# Patient Record
Sex: FEMALE | Race: WHITE | Employment: OTHER | URBAN - METROPOLITAN AREA
[De-identification: names, ages, dates, MRNs, and addresses within clinical notes are randomized per-mention and may not be internally consistent; named-entity substitution may affect disease eponyms.]

---

## 2022-12-13 ENCOUNTER — EVALUATION (OUTPATIENT)
Dept: PHYSICAL THERAPY | Facility: CLINIC | Age: 77
End: 2022-12-13

## 2022-12-13 DIAGNOSIS — M25.561 CHRONIC PAIN OF RIGHT KNEE: Primary | ICD-10-CM

## 2022-12-13 DIAGNOSIS — G89.29 CHRONIC PAIN OF RIGHT KNEE: Primary | ICD-10-CM

## 2022-12-13 DIAGNOSIS — M17.11 OSTEOARTHRITIS OF RIGHT KNEE, UNSPECIFIED OSTEOARTHRITIS TYPE: ICD-10-CM

## 2022-12-13 NOTE — PROGRESS NOTES
PT Evaluation     Today's date: 2022  Patient name: Atif Nunes  : 1945  MRN: [id number]  Referring provider: Alexys Chanel MD  Dx:   Encounter Diagnosis     ICD-10-CM    1  Chronic pain of right knee  M25 561     G89 29       2  Osteoarthritis of right knee, unspecified osteoarthritis type  M17 11           Start Time: 1230  Stop Time: 1315  Total time in clinic (min): 45 minutes    Assessment  Assessment details: Pt is a 69 y o female who presents to skilled physical therapy with complaints of chronic R knee pain and hx of falls over the last 2 years  Pt demonstrated decreased R knee A/PROM in both flexion and extension with pain, decreased knee MMT, TTP globally around her R knee, gait dysfunction, and altered functional mechanics due to pain and weakness  Pt's hx and clinical findings are consistent with the referring diagnosis  Pt's pain and deficits are preventing her from performing self care, ADLs, and recreational activities without compensations  Pt was educated on her diagnosis, prognosis, POC, and HEP  Pt would benefit from skilled physical therapy to reduce her pain, address her deficits, progress towards her goals, and return to her PLOF  Impairments: abnormal coordination, abnormal gait, abnormal muscle firing, abnormal muscle tone, abnormal or restricted ROM, abnormal movement, activity intolerance, impaired balance, impaired physical strength, lacks appropriate home exercise program, pain with function, safety issue, poor posture  and poor body mechanics    Symptom irritability: moderateUnderstanding of Dx/Px/POC: good   Prognosis: good    Goals  Short Term Goals:  1) Pt will initiate and progress her HEP in 3-4 weeks  2) Pt will improve her knee ROM by 25% with less than 3/10 pain in 3-4 weeks  3) Pt will improve knee MMT by 1 to improve ADLs in 3-4 weeks  Long Term Goals:  1) Pt will be able to ambulate for 20-30 mins with less than 2/10 pain in 6-8 weeks    2) Pt will be able to ambulate up and down 15 steps reciprocally with less than 2/10 pain in 6-8 weeks  3) Pt will be able to get up and down from the floor with less than 2/10 pain in 6-8 weeks  4) Pt will be able to perform ADLs with less than 2/10 pain in 6-8 weeks  Plan  Patient would benefit from: skilled physical therapy and PT eval  Planned modality interventions: cryotherapy  Planned therapy interventions: activity modification, ADL retraining, balance, body mechanics training, coordination, joint mobilization, manual therapy, motor coordination training, neuromuscular re-education, patient education, postural training, self care, strengthening, stretching, therapeutic activities, therapeutic exercise, flexibility, functional ROM exercises, gait training, graded exercise and home exercise program  Frequency: 1-2x/week  Duration in weeks: 8  Treatment plan discussed with: patient        Subjective Evaluation    History of Present Illness  Mechanism of injury: Pt states that her R knee is in pain, might need to have a knee replacement in the future  Pt fell on it twice in the past 2 years, states having OA in her knee as well as bone spurs  Pt had an MRI revealing a pulled ligament as well  Pt received a cortisone injection that helped for 5 weeks  Then had a gel shot which made her knee worse, felt it go down her leg  Pt felt her knee then start giving out  Followed up again with Dr Trinh Herndon and got another steroid injection which helped again  Has been feeling better for the last month  Pt feels she is not physically in good shape  Pt has a hx of polio  No previous injuries or surgeries to her knee  Pt is retired and watches her 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  Pt denies numbness or tingling, no problems sleeping     Pain  Current pain ratin  At best pain ratin  At worst pain ratin  Location: deep in the knee  Quality: sharp (stabbing)  Relieving factors: rest, ice and medications  Aggravating factors: walking, stair climbing, standing, lifting and running  Progression: worsening    Social Support  Steps to enter house: yes  Stairs in house: yes   Lives in: multiple-level home  Lives with: adult children    Employment status: not working    Diagnostic Tests  X-ray: abnormal  Patient Goals  Patient goals for therapy: decreased pain, improved balance, increased motion, increased strength and independence with ADLs/IADLs  Patient goal: get stronger, walk better        Objective     Tenderness     Right Knee   Tenderness in the LCL (distal), MCL (distal), patellar tendon, pes anserinus and quadriceps tendon  Active Range of Motion   Left Knee   Flexion: 135 degrees   Extension: 0 degrees     Right Knee   Flexion: 122 degrees with pain  Extension: -8 degrees with pain    Passive Range of Motion   Left Knee   Flexion: 140 degrees with pain  Extension: 1 degrees     Right Knee   Flexion: 124 degrees with pain  Extension: -5 degrees with pain    Strength/Myotome Testing     Left Knee   Flexion: 4  Extension: 4  Quadriceps contraction: good    Right Knee   Flexion: 3+  Extension: 3  Quadriceps contraction: fair    Ambulation   Weight-Bearing Status   Weight-Bearing Status (Left): full weight bearing   Weight-Bearing Status (Right): full weight-bearing    Assistive device used: none    Observational Gait   Gait: asymmetric   Walking speed and stride length within functional limits  Functional Assessment      Squat    Left tibial anterior translation beyond toes and right tibial anterior translation beyond toes  Forward Step Up 8"     Right Leg  Pain and valgus  Forward Step Down 8"     Right Leg  Pain, valgus and contralateral toe touch first      Single Leg Stance   Right single leg stance time: unable                  Precautions: hx of colon cancer, historectomy, COPD, asthma  HEP: Access Code [email or web address]    Date     12/13   Visit Number     1 (IE)   Manuals                                        Neuro Re-Ed Ther Ex                                                                        Ther Activity                        Gait Training                        Modalities

## 2022-12-13 NOTE — LETTER
2022    Colby Oro MD  [address] [place] [address] [place] [id number]    Patient: Jessica Aguillon   YOB: 1945   Date of Visit: 2022     Encounter Diagnosis     ICD-10-CM    1  Chronic pain of right knee  M25 561     G89 29       2  Osteoarthritis of right knee, unspecified osteoarthritis type  M17 11           Dear Dr Annmarie Miramontes: Thank you for your recent referral of Jessica Aguillon  Please review the attached evaluation summary from Carla's recent visit  Please verify that you agree with the plan of care by signing the attached order  If you have any questions or concerns, please do not hesitate to call  I sincerely appreciate the opportunity to share in the care of one of your patients and hope to have another opportunity to work with you in the near future  Sincerely,    Hilaria Salinas, PT      Referring Provider:      I certify that I have read the below Plan of Care and certify the need for these services furnished under this plan of treatment while under my care  Colby Oro MD  [address] [place] [address] [facility] [place] [address]  Via Fax: [phone]          PT Evaluation     Today's date: 2022  Patient name: Jessica Aguillon  :   MRN: [id number]  Referring provider: Qi Greenberg MD  Dx:   Encounter Diagnosis     ICD-10-CM    1  Chronic pain of right knee  M25 561     G89 29       2  Osteoarthritis of right knee, unspecified osteoarthritis type  M17 11           Start Time: 1230  Stop Time: 1315  Total time in clinic (min): 45 minutes    Assessment  Assessment details: Pt is a 69 y o female who presents to skilled physical therapy with complaints of chronic R knee pain and hx of falls over the last 2 years   Pt demonstrated decreased R knee A/PROM in both flexion and extension with pain, decreased knee MMT, TTP globally around her R knee, gait dysfunction, and altered functional mechanics due to pain and weakness  Pt's hx and clinical findings are consistent with the referring diagnosis  Pt's pain and deficits are preventing her from performing self care, ADLs, and recreational activities without compensations  Pt was educated on her diagnosis, prognosis, POC, and HEP  Pt would benefit from skilled physical therapy to reduce her pain, address her deficits, progress towards her goals, and return to her PLOF  Impairments: abnormal coordination, abnormal gait, abnormal muscle firing, abnormal muscle tone, abnormal or restricted ROM, abnormal movement, activity intolerance, impaired balance, impaired physical strength, lacks appropriate home exercise program, pain with function, safety issue, poor posture  and poor body mechanics    Symptom irritability: moderateUnderstanding of Dx/Px/POC: good   Prognosis: good    Goals  Short Term Goals:  1) Pt will initiate and progress her HEP in 3-4 weeks  2) Pt will improve her knee ROM by 25% with less than 3/10 pain in 3-4 weeks  3) Pt will improve knee MMT by 1 to improve ADLs in 3-4 weeks  Long Term Goals:  1) Pt will be able to ambulate for 20-30 mins with less than 2/10 pain in 6-8 weeks  2) Pt will be able to ambulate up and down 15 steps reciprocally with less than 2/10 pain in 6-8 weeks  3) Pt will be able to get up and down from the floor with less than 2/10 pain in 6-8 weeks  4) Pt will be able to perform ADLs with less than 2/10 pain in 6-8 weeks       Plan  Patient would benefit from: skilled physical therapy and PT eval  Planned modality interventions: cryotherapy  Planned therapy interventions: activity modification, ADL retraining, balance, body mechanics training, coordination, joint mobilization, manual therapy, motor coordination training, neuromuscular re-education, patient education, postural training, self care, strengthening, stretching, therapeutic activities, therapeutic exercise, flexibility, functional ROM exercises, gait training, graded exercise and home exercise program  Frequency: 1-2x/week  Duration in weeks: 8  Treatment plan discussed with: patient        Subjective Evaluation    History of Present Illness  Mechanism of injury: Pt states that her R knee is in pain, might need to have a knee replacement in the future  Pt fell on it twice in the past 2 years, states having OA in her knee as well as bone spurs  Pt had an MRI revealing a pulled ligament as well  Pt received a cortisone injection that helped for 5 weeks  Then had a gel shot which made her knee worse, felt it go down her leg  Pt felt her knee then start giving out  Followed up again with Dr Laura Lujan and got another steroid injection which helped again  Has been feeling better for the last month  Pt feels she is not physically in good shape  Pt has a hx of polio  No previous injuries or surgeries to her knee  Pt is retired and watches her 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  Pt denies numbness or tingling, no problems sleeping  Pain  Current pain ratin  At best pain ratin  At worst pain ratin  Location: deep in the knee  Quality: sharp (stabbing)  Relieving factors: rest, ice and medications  Aggravating factors: walking, stair climbing, standing, lifting and running  Progression: worsening    Social Support  Steps to enter house: yes  Stairs in house: yes   Lives in: multiple-level home  Lives with: adult children    Employment status: not working    Diagnostic Tests  X-ray: abnormal  Patient Goals  Patient goals for therapy: decreased pain, improved balance, increased motion, increased strength and independence with ADLs/IADLs  Patient goal: get stronger, walk better        Objective     Tenderness     Right Knee   Tenderness in the LCL (distal), MCL (distal), patellar tendon, pes anserinus and quadriceps tendon       Active Range of Motion   Left Knee   Flexion: 135 degrees   Extension: 0 degrees     Right Knee   Flexion: 122 degrees with pain  Extension: -8 degrees with pain    Passive Range of Motion   Left Knee   Flexion: 140 degrees with pain  Extension: 1 degrees     Right Knee   Flexion: 124 degrees with pain  Extension: -5 degrees with pain    Strength/Myotome Testing     Left Knee   Flexion: 4  Extension: 4  Quadriceps contraction: good    Right Knee   Flexion: 3+  Extension: 3  Quadriceps contraction: fair    Ambulation   Weight-Bearing Status   Weight-Bearing Status (Left): full weight bearing   Weight-Bearing Status (Right): full weight-bearing    Assistive device used: none    Observational Gait   Gait: asymmetric   Walking speed and stride length within functional limits  Functional Assessment      Squat    Left tibial anterior translation beyond toes and right tibial anterior translation beyond toes  Forward Step Up 8"     Right Leg  Pain and valgus  Forward Step Down 8"     Right Leg  Pain, valgus and contralateral toe touch first      Single Leg Stance   Right single leg stance time: unable                Precautions: hx of colon cancer, historectomy, COPD, asthma  HEP: Access Code [email or web address]    Date     12/13   Visit Number     1 (IE)   Manuals                                        Neuro Re-Ed                                                                 Ther Ex                                                                        Ther Activity                        Gait Training                        Modalities

## 2022-12-20 ENCOUNTER — APPOINTMENT (OUTPATIENT)
Dept: PHYSICAL THERAPY | Facility: CLINIC | Age: 77
End: 2022-12-20

## 2023-01-03 ENCOUNTER — OFFICE VISIT (OUTPATIENT)
Dept: PHYSICAL THERAPY | Facility: CLINIC | Age: 78
End: 2023-01-03

## 2023-01-03 DIAGNOSIS — G89.29 CHRONIC PAIN OF RIGHT KNEE: Primary | ICD-10-CM

## 2023-01-03 DIAGNOSIS — M25.561 CHRONIC PAIN OF RIGHT KNEE: Primary | ICD-10-CM

## 2023-01-03 DIAGNOSIS — M17.11 OSTEOARTHRITIS OF RIGHT KNEE, UNSPECIFIED OSTEOARTHRITIS TYPE: ICD-10-CM

## 2023-01-03 NOTE — PROGRESS NOTES
Daily Note     Today's date: 1/3/2023  Patient name: Jayant Gallegos  : 1945  MRN: [id number]  Referring provider: Ruddy Estrella MD  Dx:   Encounter Diagnosis     ICD-10-CM    1  Chronic pain of right knee  M25 561     G89 29       2  Osteoarthritis of right knee, unspecified osteoarthritis type  M17 11           Start Time: 1100  Stop Time: 1145  Total time in clinic (min): 45 minutes    Subjective: Pt reports less intense pain over the last few weeks  Pt admits not doing her exercises exactly as prescribed over the holidays, but was able to do them "here and there"  Pt notices improvements but knows she is still weak  Pt denies pain prior to the start of her treatment session  Objective: See treatment diary below      Assessment: Tolerated treatment well  Patient demonstrated fatigue post treatment, exhibited good technique with therapeutic exercises and would benefit from continued PT      Plan: Continue per plan of care  Progress treatment as tolerated           Precautions: hx of colon cancer, historectomy, COPD, asthma  HEP: Access Code [email or web address]    Date    1/3/22 12/13   Visit Number    2 1 (IE)   Manuals                                        Neuro Re-Ed         Quad sets    2x10    LAQ    15x    SLR    10x    SAQ    15x    TKE    amparo 3 5 15x                    Ther Ex        bike    5 mins    Seated HS str    3x30s    Seated HS curl    RTB 15x    Side steps    3 laps at table                                    Ther Activity                        Gait Training                        Modalities

## 2023-01-12 ENCOUNTER — APPOINTMENT (OUTPATIENT)
Dept: PHYSICAL THERAPY | Facility: CLINIC | Age: 78
End: 2023-01-12

## 2023-01-18 ENCOUNTER — APPOINTMENT (OUTPATIENT)
Dept: PHYSICAL THERAPY | Facility: CLINIC | Age: 78
End: 2023-01-18

## 2023-01-24 ENCOUNTER — APPOINTMENT (OUTPATIENT)
Dept: PHYSICAL THERAPY | Facility: CLINIC | Age: 78
End: 2023-01-24

## 2023-01-31 ENCOUNTER — EVALUATION (OUTPATIENT)
Dept: PHYSICAL THERAPY | Facility: CLINIC | Age: 78
End: 2023-01-31

## 2023-01-31 DIAGNOSIS — G89.29 CHRONIC PAIN OF RIGHT KNEE: Primary | ICD-10-CM

## 2023-01-31 DIAGNOSIS — M25.561 CHRONIC PAIN OF RIGHT KNEE: Primary | ICD-10-CM

## 2023-01-31 DIAGNOSIS — M17.11 OSTEOARTHRITIS OF RIGHT KNEE, UNSPECIFIED OSTEOARTHRITIS TYPE: ICD-10-CM

## 2023-01-31 NOTE — LETTER
2023    Hershal Carrel, MD  [address] [place] [address] [place] [address]    Patient: Marvin Trujillo   YOB: 1945   Date of Visit: 2023     Encounter Diagnosis     ICD-10-CM    1  Chronic pain of right knee  M25 561     G89 29       2  Osteoarthritis of right knee, unspecified osteoarthritis type  M17 11           Dear Dr Akhil Hansen: Thank you for your recent referral of Marvin Trujillo  Please review the attached evaluation summary from Carla's recent visit  Please verify that you agree with the plan of care by signing the attached order  If you have any questions or concerns, please do not hesitate to call  I sincerely appreciate the opportunity to share in the care of one of your patients and hope to have another opportunity to work with you in the near future  Sincerely,    [place], PT      Referring Provider:      I certify that I have read the below Plan of Care and certify the need for these services furnished under this plan of treatment while under my care  Hershal Carrel, MD  [address] [place] [address] [facility] [place] [address]  Via Fax: [phone]          PT Re-Evaluation     Today's date: 2023  Patient name: Marvin Trujillo  :   MRN: [id number]  Referring provider: Shashi Arzola MD  Dx:   Encounter Diagnosis     ICD-10-CM    1  Chronic pain of right knee  M25 561     G89 29       2  Osteoarthritis of right knee, unspecified osteoarthritis type  M17 11           Start Time: 1150  Stop Time: 1230  Total time in clinic (min): 40 minutes    Assessment  Assessment details: Pt is a 69 y o female who presents to skilled physical therapy for her 3rd visit with complaints of chronic R knee pain  Due to pt being sick over the past few weeks pt has not been seen much in PT and has not been able to perform her HEP as frequently as she wanted   Pt demonstrated minimal changes with R knee A/PROM in both flexion and extension with pain, maintained knee MMT, decreased TTP globally around her R knee, gait dysfunction, and altered functional mechanics due to pain and weakness  Pt has made minimal progress since starting due to her lack of visits and consistency with her HEP  Pt's remaining pain and deficits are preventing her from performing self care, ADLs, and recreational activities without compensations  Pt was re-educated on her diagnosis and the importance of performing her HEP consistently  Pt would continue to benefit from skilled physical therapy to reduce her pain, address her deficits, progress towards her goals, and return to her PLOF  Impairments: abnormal coordination, abnormal gait, abnormal muscle firing, abnormal muscle tone, abnormal or restricted ROM, abnormal movement, activity intolerance, impaired balance, impaired physical strength, lacks appropriate home exercise program, pain with function, safety issue, poor posture  and poor body mechanics    Symptom irritability: moderateUnderstanding of Dx/Px/POC: good   Prognosis: fair    Goals  Short Term Goals:  1) Pt will initiate and progress her HEP in 3-4 weeks  - Progressing  2) Pt will improve her knee ROM by 25% with less than 3/10 pain in 3-4 weeks  - Progressing  3) Pt will improve knee MMT by 1 to improve ADLs in 3-4 weeks  - Progressing    Long Term Goals:  1) Pt will be able to ambulate for 20-30 mins with less than 2/10 pain in 6-8 weeks  - Progressing  2) Pt will be able to ambulate up and down 15 steps reciprocally with less than 2/10 pain in 6-8 weeks  - Progressing  3) Pt will be able to get up and down from the floor with less than 2/10 pain in 6-8 weeks  - Progressing  4) Pt will be able to perform ADLs with less than 2/10 pain in 6-8 weeks  -Progressing    Plan  Patient would benefit from: skilled physical therapy and PT eval  Planned modality interventions: cryotherapy  Planned therapy interventions: activity modification, ADL retraining, balance, body mechanics training, coordination, joint mobilization, manual therapy, motor coordination training, neuromuscular re-education, patient education, postural training, self care, strengthening, stretching, therapeutic activities, therapeutic exercise, flexibility, functional ROM exercises, gait training, graded exercise and home exercise program  Frequency: 1-2x/week  Duration in weeks: 8  Treatment plan discussed with: patient        Subjective Evaluation    History of Present Illness  Mechanism of injury: 23 Update:  Pt states that she was sore after her last treatment session  She hasn't had "extra problems" over the past few weeks, admits to doing them once every other day  Pt is trying to exercise both of her legs  Pt had COVID so she had to cancel several of her appointments  No follow-ups planned at this time  Pt feels her R knee is better since coming for her evaluation, she feels she can walk a little longer without feeling the same knee pain  Tries stairs but it is tough  Pain  Current pain ratin  At best pain ratin  At worst pain ratin  Location: deep in the knee  Quality: dull ache (stabbing)  Relieving factors: rest and ice  Aggravating factors: walking, stair climbing, standing, lifting and running  Progression: improved    Social Support  Steps to enter house: yes  Stairs in house: yes   Lives in: multiple-level home  Lives with: adult children    Employment status: not working    Diagnostic Tests  X-ray: abnormal  Patient Goals  Patient goals for therapy: decreased pain, improved balance, increased motion, increased strength and independence with ADLs/IADLs  Patient goal: get stronger, walk better        Objective     Tenderness     Right Knee   No tenderness in the LCL (distal), MCL (distal), patellar tendon, pes anserinus and quadriceps tendon       Active Range of Motion   Left Knee   Flexion: 135 degrees   Extension: 0 degrees     Right Knee   Flexion: 122 degrees with pain  Extension: -7 degrees with pain    Passive Range of Motion   Left Knee   Flexion: 140 degrees with pain  Extension: 1 degrees     Right Knee   Flexion: 125 degrees with pain  Extension: -5 degrees with pain    Strength/Myotome Testing     Left Knee   Flexion: 4  Extension: 4  Quadriceps contraction: good    Right Knee   Flexion: 3+  Extension: 3+  Quadriceps contraction: fair    Ambulation   Weight-Bearing Status   Weight-Bearing Status (Left): full weight bearing   Weight-Bearing Status (Right): full weight-bearing    Assistive device used: none    Observational Gait   Gait: within functional limits and antalgic   Walking speed and stride length within functional limits  Functional Assessment      Squat    Left tibial anterior translation beyond toes and right tibial anterior translation beyond toes  Forward Step Up 8"     Right Leg  Pain and valgus       Forward Step Down 8"     Right Leg  Pain, valgus and contralateral toe touch first      Single Leg Stance - Eyes Open   Left  Trial 1: 4 seconds  Trial 2: 4 seconds  Trial 3: 3 seconds  Average: 3 67 seconds     Right  Trial 1: 4 seconds  Trial 2: 2 seconds  Trial 3: 3 seconds  Average: 3 seconds               Precautions: hx of colon cancer, historectomy, COPD, asthma  HEP: Access Code [email or web address]    Date   1/31/23 1/3/23 12/13   Visit Number   3 2 1 (IE)   Manuals                                        Neuro Re-Ed         Quad sets    2x10    LAQ   2x10 15x    SLR   10x 10x    SAQ    15x    TKE    amparo 3 5 15x    bridges   2x10             Ther Ex        bike    5 mins    Seated HS str    3x30s    Seated HS curl    RTB 15x    Side steps    3 laps at table                                    Ther Activity                        Gait Training                        Modalities

## 2023-01-31 NOTE — PROGRESS NOTES
PT Re-Evaluation     Today's date: 2023  Patient name: Sridhar Marquis  : 1945  MRN: [id number]  Referring provider: Lake Farley MD  Dx:   Encounter Diagnosis     ICD-10-CM    1  Chronic pain of right knee  M25 561     G89 29       2  Osteoarthritis of right knee, unspecified osteoarthritis type  M17 11           Start Time: 1150  Stop Time: 1230  Total time in clinic (min): 40 minutes    Assessment  Assessment details: Pt is a 69 y o female who presents to skilled physical therapy for her 3rd visit with complaints of chronic R knee pain  Due to pt being sick over the past few weeks pt has not been seen much in PT and has not been able to perform her HEP as frequently as she wanted  Pt demonstrated minimal changes with R knee A/PROM in both flexion and extension with pain, maintained knee MMT, decreased TTP globally around her R knee, gait dysfunction, and altered functional mechanics due to pain and weakness  Pt has made minimal progress since starting due to her lack of visits and consistency with her HEP  Pt's remaining pain and deficits are preventing her from performing self care, ADLs, and recreational activities without compensations  Pt was re-educated on her diagnosis and the importance of performing her HEP consistently  Pt would continue to benefit from skilled physical therapy to reduce her pain, address her deficits, progress towards her goals, and return to her PLOF  Impairments: abnormal coordination, abnormal gait, abnormal muscle firing, abnormal muscle tone, abnormal or restricted ROM, abnormal movement, activity intolerance, impaired balance, impaired physical strength, lacks appropriate home exercise program, pain with function, safety issue, poor posture  and poor body mechanics    Symptom irritability: moderateUnderstanding of Dx/Px/POC: good   Prognosis: fair    Goals  Short Term Goals:  1) Pt will initiate and progress her HEP in 3-4 weeks  - Progressing  2) Pt will improve her knee ROM by 25% with less than 3/10 pain in 3-4 weeks  - Progressing  3) Pt will improve knee MMT by 1 to improve ADLs in 3-4 weeks  - Progressing    Long Term Goals:  1) Pt will be able to ambulate for 20-30 mins with less than 2/10 pain in 6-8 weeks  - Progressing  2) Pt will be able to ambulate up and down 15 steps reciprocally with less than 2/10 pain in 6-8 weeks  - Progressing  3) Pt will be able to get up and down from the floor with less than 2/10 pain in 6-8 weeks  - Progressing  4) Pt will be able to perform ADLs with less than 2/10 pain in 6-8 weeks  -Progressing    Plan  Patient would benefit from: skilled physical therapy and PT eval  Planned modality interventions: cryotherapy  Planned therapy interventions: activity modification, ADL retraining, balance, body mechanics training, coordination, joint mobilization, manual therapy, motor coordination training, neuromuscular re-education, patient education, postural training, self care, strengthening, stretching, therapeutic activities, therapeutic exercise, flexibility, functional ROM exercises, gait training, graded exercise and home exercise program  Frequency: 1-2x/week  Duration in weeks: 8  Treatment plan discussed with: patient        Subjective Evaluation    History of Present Illness  Mechanism of injury: 23 Update:  Pt states that she was sore after her last treatment session  She hasn't had "extra problems" over the past few weeks, admits to doing them once every other day  Pt is trying to exercise both of her legs  Pt had COVID so she had to cancel several of her appointments  No follow-ups planned at this time  Pt feels her R knee is better since coming for her evaluation, she feels she can walk a little longer without feeling the same knee pain  Tries stairs but it is tough    Pain  Current pain ratin  At best pain ratin  At worst pain ratin  Location: deep in the knee  Quality: dull ache (stabbing)  Relieving factors: rest and ice  Aggravating factors: walking, stair climbing, standing, lifting and running  Progression: improved    Social Support  Steps to enter house: yes  Stairs in house: yes   Lives in: multiple-level home  Lives with: adult children    Employment status: not working    Diagnostic Tests  X-ray: abnormal  Patient Goals  Patient goals for therapy: decreased pain, improved balance, increased motion, increased strength and independence with ADLs/IADLs  Patient goal: get stronger, walk better        Objective     Tenderness     Right Knee   No tenderness in the LCL (distal), MCL (distal), patellar tendon, pes anserinus and quadriceps tendon  Active Range of Motion   Left Knee   Flexion: 135 degrees   Extension: 0 degrees     Right Knee   Flexion: 122 degrees with pain  Extension: -7 degrees with pain    Passive Range of Motion   Left Knee   Flexion: 140 degrees with pain  Extension: 1 degrees     Right Knee   Flexion: 125 degrees with pain  Extension: -5 degrees with pain    Strength/Myotome Testing     Left Knee   Flexion: 4  Extension: 4  Quadriceps contraction: good    Right Knee   Flexion: 3+  Extension: 3+  Quadriceps contraction: fair    Ambulation   Weight-Bearing Status   Weight-Bearing Status (Left): full weight bearing   Weight-Bearing Status (Right): full weight-bearing    Assistive device used: none    Observational Gait   Gait: within functional limits and antalgic   Walking speed and stride length within functional limits  Functional Assessment      Squat    Left tibial anterior translation beyond toes and right tibial anterior translation beyond toes  Forward Step Up 8"     Right Leg  Pain and valgus       Forward Step Down 8"     Right Leg  Pain, valgus and contralateral toe touch first      Single Leg Stance - Eyes Open   Left  Trial 1: 4 seconds  Trial 2: 4 seconds  Trial 3: 3 seconds  Average: 3 67 seconds     Right  Trial 1: 4 seconds  Trial 2: 2 seconds  Trial 3: 3 seconds  Average: 3 seconds                 Precautions: hx of colon cancer, historectomy, COPD, asthma  HEP: Access Code [email or web address]    Date   1/31/23 1/3/23 12/13   Visit Number   3 2 1 (IE)   Manuals                                        Neuro Re-Ed         Quad sets    2x10    LAQ   2x10 15x    SLR   10x 10x    SAQ    15x    TKE    amparo 3 5 15x    bridges   2x10             Ther Ex        bike    5 mins    Seated HS str    3x30s    Seated HS curl    RTB 15x    Side steps    3 laps at table                                    Ther Activity                        Gait Training                        Modalities

## 2023-02-07 ENCOUNTER — APPOINTMENT (OUTPATIENT)
Dept: PHYSICAL THERAPY | Facility: CLINIC | Age: 78
End: 2023-02-07

## 2023-02-09 ENCOUNTER — OFFICE VISIT (OUTPATIENT)
Dept: PHYSICAL THERAPY | Facility: CLINIC | Age: 78
End: 2023-02-09

## 2023-02-09 DIAGNOSIS — G89.29 CHRONIC PAIN OF RIGHT KNEE: Primary | ICD-10-CM

## 2023-02-09 DIAGNOSIS — M25.561 CHRONIC PAIN OF RIGHT KNEE: Primary | ICD-10-CM

## 2023-02-09 DIAGNOSIS — M17.11 OSTEOARTHRITIS OF RIGHT KNEE, UNSPECIFIED OSTEOARTHRITIS TYPE: ICD-10-CM

## 2023-02-09 NOTE — PROGRESS NOTES
Daily Note     Today's date: 2023  Patient name: Barb Zapata  : 1945  MRN: [id number]  Referring provider: Kinga Woody MD  Dx:   Encounter Diagnosis     ICD-10-CM    1  Chronic pain of right knee  M25 561     G89 29       2  Osteoarthritis of right knee, unspecified osteoarthritis type  M17 11           Start Time: 1102  Stop Time: 1145  Total time in clinic (min): 43 minutes    Subjective: Pt is feeling better from when she was sick earlier in the week  Pt states she was very sore after her last session that lasted a few days  Pt states doing her HEP consistently and stating that they are difficult but good  Pt states minimal pain this morning, mostly soreness in her LEs  Objective: See treatment diary below      Assessment: Tolerated treatment well  Patient demonstrated fatigue post treatment, exhibited good technique with therapeutic exercises and would benefit from continued PT  Pt continues with global LE weakness, lack of endurance, however is able to complete her exercises with minimal VC for positioning  Pt given progressions of clamshells, squats, and standing HS curls to further challenge the associated muscle groups, pt was fatigued but denied pain with these additions  Plan: Continue per plan of care  Progress treatment as tolerated           Precautions: hx of colon cancer, historectomy, COPD, asthma  HEP: Access Code [email or web address]    Date  2/9 1/31/23 1/3/23 12/13   Visit Number  4 3 2 1 (IE)   Manuals                                        Neuro Re-Ed         Quad sets    2x10    LAQ  15x B 2x10 15x    SLR  10x B 10x 10x    SAQ    15x    TKE  amparo 4 5 2x10 B  amparo 3 5 15x    bridges  2x10 2x10     clamshells  15x B      squats  15x at bar      Ther Ex        bike  5 mins  5 mins    Seated HS str  2x30s  3x30s    Seated HS curl  Standing 2x10 B  RTB 15x    Side steps  3 laps at bar  3 laps at table                                    Ther Activity Gait Training                        Modalities                          Insurance:  Foster/CMS Eval/ Re-eval POC expires Poonam Lean #/ Referral # Total   Visits  Start date  Expiration date Extension  Visit limitation? PT only or  PT+OT?  Co-Insurance   AMA 1/31/23 3/28/23  [id number] 4 1/31 3/13/23  BOMN  No                                                                 AUTH #: [id number] Date 1/31 2/9             Visits  Authed: 4 Used 1 1              Remaining  3 2

## 2023-02-13 ENCOUNTER — OFFICE VISIT (OUTPATIENT)
Dept: PHYSICAL THERAPY | Facility: CLINIC | Age: 78
End: 2023-02-13

## 2023-02-13 DIAGNOSIS — G89.29 CHRONIC PAIN OF RIGHT KNEE: Primary | ICD-10-CM

## 2023-02-13 DIAGNOSIS — M25.561 CHRONIC PAIN OF RIGHT KNEE: Primary | ICD-10-CM

## 2023-02-13 DIAGNOSIS — M17.11 OSTEOARTHRITIS OF RIGHT KNEE, UNSPECIFIED OSTEOARTHRITIS TYPE: ICD-10-CM

## 2023-02-13 NOTE — PROGRESS NOTES
Daily Note     Today's date: 2023  Patient name: Marvin Trujillo  : 1945  MRN: [id number]  Referring provider: Shashi Arzola MD  Dx:   Encounter Diagnosis     ICD-10-CM    1  Chronic pain of right knee  M25 561     G89 29       2  Osteoarthritis of right knee, unspecified osteoarthritis type  M17 11           Start Time: 1145  Stop Time: 1225  Total time in clinic (min): 40 minutes    Subjective: Pt reports that her leg was sore after her last treatment session, but it hasn't been as bad as it was after previous sessions  Pt feels she is getting improvement with the HEP but still feels weak and pain with ADLs  Pt denies any pain at rest prior to the start of her treatment session  Objective: See treatment diary below      Assessment: Tolerated treatment well  Patient demonstrated fatigue post treatment, exhibited good technique with therapeutic exercises and would benefit from continued PT      Plan: Continue per plan of care  Progress treatment as tolerated         Precautions: hx of colon cancer, historectomy, COPD, asthma  HEP: Access Code [email or web address]    Date 2/13 2/9 1/31/23 1/3/23 12/13   Visit Number 5 4 3 2 1 (IE)   Manuals                                        Neuro Re-Ed         Quad sets    2x10    balance Feet together airex 30s  Tandem 10x10s B       LAQ 2lbs 15x B 15x B 2x10 15x    SLR 10x B 10x B 10x 10x    SAQ    15x    TKE amparo 5 0 2x10 B amparo 4 5 2x10 B  amparo 3 5 15x    bridges 2x10 2x10 2x10     clamshells Supine RTB 2x10 15x B      squats 2x10 at bar 15x at bar      Ther Ex        bike 5 mins 5 mins  5 mins    Seated HS str  2x30s  3x30s    Seated HS curl  Standing 2x10 B  RTB 15x    Side steps 3 laps at bar 3 laps at bar  3 laps at table                                    Ther Activity                        Gait Training                        Modalities                          Insurance:  AMA/CMS Eval/ Re-eval POC expires Bernarda Bidding #/ Referral # Total   Visits Start date  Expiration date Extension  Visit limitation? PT only or  PT+OT?  Co-Insurance   AMA 1/31/23 3/28/23  [id number] 4 1/31 3/13/23  BOMN  No                                                                 AUTH #: [id number] Date 1/31 2/9 2/13            Visits  Authed: 4 Used 1 1 1             Remaining  3 2 1

## 2023-02-21 ENCOUNTER — OFFICE VISIT (OUTPATIENT)
Dept: PHYSICAL THERAPY | Facility: CLINIC | Age: 78
End: 2023-02-21

## 2023-02-21 DIAGNOSIS — M25.561 CHRONIC PAIN OF RIGHT KNEE: Primary | ICD-10-CM

## 2023-02-21 DIAGNOSIS — G89.29 CHRONIC PAIN OF RIGHT KNEE: Primary | ICD-10-CM

## 2023-02-21 DIAGNOSIS — M17.11 OSTEOARTHRITIS OF RIGHT KNEE, UNSPECIFIED OSTEOARTHRITIS TYPE: ICD-10-CM

## 2023-02-21 NOTE — PROGRESS NOTES
Daily Note     Today's date: 2023  Patient name: Ladarius Sharma  :   MRN: [id number]  Referring provider: Jeannie Mack MD  Dx:   Encounter Diagnosis     ICD-10-CM    1  Chronic pain of right knee  M25 561     G89 29       2  Osteoarthritis of right knee, unspecified osteoarthritis type  M17 11           Start Time: 1100  Stop Time: 1150  Total time in clinic (min): 50 minutes    Subjective: Pt reports not being diligent with her HEP this past week and was experiencing more knee pain  Pt states that she knows she needs to do her exercises to feel better, is trying to walk more at home  Pt states R knee discomfort prior to the start of her treatment session  Objective: See treatment diary below      Assessment: Tolerated treatment well  Patient demonstrated fatigue post treatment, exhibited good technique with therapeutic exercises and would benefit from continued PT  Pt continues to demonstrate low levels of quad strength and endurance as evidenced by breaks in between reps/sets and difficulty with progressions  Balance was progressed to biodex instability with appropriate difficulty  Plan: Continue per plan of care  Progress treatment as tolerated         Precautions: hx of colon cancer, historectomy, COPD, asthma  HEP: Access Code [email or web address]    Date 2/21 2/13 2/9 1/31/23 1/3/23   Visit Number 6 5 4 3 2   Manuals                                        Neuro Re-Ed         Quad sets     2x10   balance  Feet together airex 30s  Tandem 10x10s B      LAQ 3lbs 15x B 2lbs 15x B 15x B 2x10 15x   SLR 10x B 10x B 10x B 10x 10x   SAQ     15x   TKE  amparo 5 0 2x10 B amparo 4 5 2x10 B  amparo 3 5 15x   bridges 2x10 w/RTB abd 2x10 2x10 2x10    clamshells SL RTB 2x10 B Supine RTB 2x10 15x B     squats On biodex 2x10 static 2x10 at bar 15x at bar     biodex Balance static, L11, 9, 9 30s ea       Ther Ex        bike 5 mins while reviewing subj  5 mins 5 mins  5 mins   Seated HS str 2x30s B 2x30s  3x30s   Seated HS curl   Standing 2x10 B  RTB 15x   Side steps 5 laps at table 3 laps at bar 3 laps at bar  3 laps at table                                   Ther Activity                        Gait Training                        Modalities                          Insurance:  AMA/CMS Eval/ Re-eval POC expires Geena Lynnette #/ Referral # Total   Visits  Start date  Expiration date Extension  Visit limitation? PT only or  PT+OT?  Co-Insurance   AMA 1/31/23 3/28/23  [id number] 4 1/31 3/13/23  BOMN  No                                                                 AUTH #: [id number] Date 1/31 2/9 2/13 2/21           Visits  Authed: 4 Used 1 1 1 1            Remaining  3 2 1 0

## 2023-02-28 ENCOUNTER — APPOINTMENT (OUTPATIENT)
Dept: PHYSICAL THERAPY | Facility: CLINIC | Age: 78
End: 2023-02-28

## 2023-03-02 ENCOUNTER — TELEPHONE (OUTPATIENT)
Dept: DIABETES SERVICES | Facility: CLINIC | Age: 78
End: 2023-03-02

## 2023-03-02 NOTE — TELEPHONE ENCOUNTER
Patient would like make appt with Jaime Mcpherson in the [place] office for mnt for pre-dm  I explained that I will have to send form to her pcp to have them complete before I can schedule  I encouraged patient to check with insurance to make sure visit is covered for pre-dm dx

## 2023-03-29 ENCOUNTER — OFFICE VISIT (OUTPATIENT)
Dept: DIABETES SERVICES | Facility: CLINIC | Age: 78
End: 2023-03-29

## 2023-03-29 VITALS — WEIGHT: 182 LBS

## 2023-03-29 DIAGNOSIS — R73.03 PREDIABETES: Primary | ICD-10-CM

## 2023-03-29 NOTE — PROGRESS NOTES
Medical Nutrition Therapy        Assessment    Visit Type: Initial visit  Chief complaint/Medical Diagnosis/reason for visit R73 03 Pre Diabetes    HPI Judge Machado came in today for her initial Medical Nutrition Therapy (MNT) appointment  Judge Machado stated her most current A1C= 5 9 as of 2/23/23  Judge Machado stated she wants to lower her A1C, as she does not want to develop diabetes  Judge Machado also stated she wants a meal plan to help her lower her A1C  Therefore, first obtained a 24 hour food recall from Judge Machado  Problems identified in food recall include irregular timing of meals, inconsistent carbohydrate intake, and unbalanced meals  Judge Machado also stated she lives with her daughter, son-in-law, and 2 grandchildren  Judge Machado stated she shares the cooking responsibilities with her daughter, and both of them do grocery shopping  Provided patient with a 1500 calorie balanced individualized meal plan to assist with consistency, balance and portion control  Encouraged the consumption of balanced meals and snacks at appropriate times  Advised patient to keep carbohydrate intake to 30 grams per meal and 15 grams per snack to assist with glycemic control  My Plate, food models, portion booklet and food labels were used to teach basic carbohydrate counting  A lower salt dietary intake was also discussed along with a lower saturated fat intake  Patient stated she will call at a later date if she desires additional MNT education  RD will remain available for further dietary questions/concerns  Ht Readings from Last 1 Encounters:   No data found for Ht     Wt Readings from Last 3 Encounters:   03/29/23 82 6 kg (182 lb)     Weight Change: No    Barriers to Learning: no barriers    Do you follow any special diet presently?: Yes - tries to eat healthy    Who shops: patient  And daughter   patient and daughter    Food Log: Completed via the method of food recall    Breakfast:3x/week - 11AM - 2-3 egg white omelet with spinach and shredded mozzarella cheese, 2-3 sl turkey wan  Morning Snack:None  Lunch:1-2pm - ham sandwich on 6-4-7 bread or on [employer] bread, (was drinking 12 oz- 2%milk, not any more), water  Afternoon Snack: 4pm - orange or popcorn/sun chips or carrots+ peppers with hummus  Dinner:7PM - pork tenderloin, noodles, broccoli (was drinking 12 oz - 2% milk, not anymore), water  Evening Snack:10 pm - tea with Corey Joanna + 2 Girl  cookies  Beverages: tea, 2% milk, water  Eating out/Take out:occasionally  Exercise plans to start daily walking    Calorie needs 1500kcals/day  Carbs: 30g/meal, 15g/snack        Nutrition Diagnosis:  Food and nutrition related knowledge deficit  related to Lack of prior exposure to accurate nutrition related information as evidenced by Verbalizes inaccurate or incomplete information    Intervention: plate method, label reading, carbohydrate counting, increased protein intake, meal timing, weight/ BMI goals, meal planning, individualized meal plan, monitoring portion control, exercise guidelines and food diary     Treatment Goals: Patient will consume 3 meals a day, Patient will consume snacks and Patient will count carbohydrates    Monitoring and evaluation:    Term code indicator  FH 1 3 2 Food Intake Criteria: Consume 3 balanced meals/day at appropriate times  Term code indicator  FH 1 6 3 Carbohydrate Intake Criteria: 30 grams carbohydrate/meal and 15 grams carbohydrate/snack  Term code indicator  FH 1 3 2 Food Intake Criteria: Consume 2 balanced snacks/day at appropriate times  Materials Provided: Portion booklet, individualized meal plan, CHO counting, food logs    Patient’s Response to Instruction:  Comprehensiongood  Motivationgood  Expected Compliancegood    Begin Time: 10:45AM  End Time: 11:45AM  Referring Provider: Natalia Sibley MD    Thank you for coming to the [address]  for education today    Please feel free to call with any questions or concerns      Renetta Cardoza  [address] [facility] [id number] [phone]

## 2025-06-29 ENCOUNTER — OFFICE VISIT (OUTPATIENT)
Dept: URGENT CARE | Facility: CLINIC | Age: 80
End: 2025-06-29
Payer: COMMERCIAL

## 2025-06-29 VITALS
OXYGEN SATURATION: 96 % | RESPIRATION RATE: 18 BRPM | DIASTOLIC BLOOD PRESSURE: 68 MMHG | BODY MASS INDEX: 34.74 KG/M2 | HEIGHT: 61 IN | WEIGHT: 184 LBS | SYSTOLIC BLOOD PRESSURE: 156 MMHG | TEMPERATURE: 97.4 F | HEART RATE: 69 BPM

## 2025-06-29 DIAGNOSIS — H10.32 ACUTE CONJUNCTIVITIS OF LEFT EYE, UNSPECIFIED ACUTE CONJUNCTIVITIS TYPE: Primary | ICD-10-CM

## 2025-06-29 PROCEDURE — G2211 COMPLEX E/M VISIT ADD ON: HCPCS | Performed by: PHYSICIAN ASSISTANT

## 2025-06-29 PROCEDURE — 99213 OFFICE O/P EST LOW 20 MIN: CPT | Performed by: PHYSICIAN ASSISTANT

## 2025-06-29 RX ORDER — OFLOXACIN 3 MG/ML
1 SOLUTION/ DROPS OPHTHALMIC 4 TIMES DAILY
Qty: 5 ML | Refills: 0 | Status: SHIPPED | OUTPATIENT
Start: 2025-06-29 | End: 2025-07-06

## 2025-06-29 NOTE — PATIENT INSTRUCTIONS
L Acute Conjunctivitis:   -The patient's hx is consistent with possible bacterial conjunctivitis. Will prescribe Ofloxacin eye drops to be used as directed.   -Cool or warm compresses on the eyes for comfort   -Saline eye drops to flush discharge. Keep the drops in the fridge for a cooling effect.    -Avoid contact lenses until your symptoms improve  -Sunglasses can be worn for light sensitivity  -Frequent hand washing to avoid the spread  -Follow up with your Eye Doctor immediately if your sx worsen or persist. Ophthalmology follow up as discussed in 1-2 days.

## 2025-06-29 NOTE — PROGRESS NOTES
[facility]        NAME: Carla Gomez is a 79 y.o. female  : 1945    MRN: [id number]  DATE: 2025  TIME: 4:22 PM    Assessment and Plan   Acute conjunctivitis of left eye, unspecified acute conjunctivitis type [H10.32]  1. Acute conjunctivitis of left eye, unspecified acute conjunctivitis type  ofloxacin (OCUFLOX) 0.3 % ophthalmic solution              Patient Instructions   L Acute Conjunctivitis:   -The patient's hx is consistent with possible bacterial conjunctivitis. Will prescribe Ofloxacin eye drops to be used as directed.   -Cool or warm compresses on the eyes for comfort   -Saline eye drops to flush discharge. Keep the drops in the fridge for a cooling effect.    -Avoid contact lenses until your symptoms improve  -Sunglasses can be worn for light sensitivity  -Frequent hand washing to avoid the spread  -Follow up with your Eye Doctor immediately if your sx worsen or persist. Ophthalmology follow up as discussed in 1-2 days.         -Follow up with PCP in 3-5 days.  -Proceed to ER if symptoms worsen.    Chief Complaint     Chief Complaint   Patient presents with    Eye Problem     Red irritated left eye lid with ptosis had surgery on eye lid 3 yrs. ago         History of Present Illness       Pt is a 79-year-old female here with complaints of a red and irritated left eyelid since yesterday 25. Pt had surgery on eye lid 3 years ago. Pt did not wake up with it crusted shut but woke up with yellow discharge. No photophobia. No vision changes. No injury to her left eye. No foreign body. No headache. Slight pruritis.         Eye Problem   The left eye is affected. This is a new problem. The current episode started yesterday. There was no injury mechanism. The pain is at a severity of 0/10. The patient is experiencing no pain. Associated symptoms include an eye discharge and itching. Pertinent negatives include no blurred vision, double vision, eye redness, fever, foreign body  "sensation, nausea, photophobia, recent URI, vomiting or weakness. She has tried nothing for the symptoms.       Review of Systems   Review of Systems   Constitutional:  Negative for activity change, appetite change, chills, diaphoresis, fatigue and fever.   HENT:  Negative for drooling, ear pain, facial swelling, hearing loss, rhinorrhea, sinus pressure, sinus pain, sneezing, sore throat, tinnitus, trouble swallowing and voice change.    Eyes:  Positive for discharge and itching. Negative for blurred vision, double vision, photophobia, pain, redness and visual disturbance.   Respiratory:  Negative for cough, choking, chest tightness, shortness of breath, wheezing and stridor.    Cardiovascular:  Negative for chest pain, palpitations and leg swelling.   Gastrointestinal:  Negative for constipation, diarrhea, nausea and vomiting.   Skin:  Negative for rash and wound.   Neurological:  Negative for dizziness, tremors, syncope, facial asymmetry, speech difficulty, weakness, light-headedness, numbness and headaches.   Psychiatric/Behavioral:  The patient is not nervous/anxious.          Current Medications     Current Medications[1]    Current Allergies     Allergies as of 06/29/2025 - Reviewed 06/29/2025   Allergen Reaction Noted    Sulfa antibiotics Hives 03/29/2023            The following portions of the patient's history were reviewed and updated as appropriate: allergies, current medications, past family history, past medical history, past social history, past surgical history and problem list.     Past Medical History[2]    Past Surgical History[3]    Family History[4]      Medications have been verified.        Objective   /68   Pulse 69   Temp (!) 97.4 °F (36.3 °C)   Resp 18   Ht 5' 1\" (1.549 m)   Wt 83.5 kg (184 lb)   SpO2 96%   BMI 34.77 kg/m²        Physical Exam     Physical Exam  Vitals reviewed.   Constitutional:       General: She is not in acute distress.     Appearance: Normal appearance. " She is normal weight. She is not ill-appearing, toxic-appearing or diaphoretic.   HENT:      Nose: No congestion or rhinorrhea.     Eyes:      General: Lids are normal. Lids are everted, no foreign bodies appreciated. Vision grossly intact.         Right eye: No foreign body, discharge or hordeolum.         Left eye: Discharge and hordeolum present.No foreign body.      Extraocular Movements: Extraocular movements intact.      Conjunctiva/sclera:      Right eye: Right conjunctiva is not injected. No chemosis or exudate.     Left eye: Left conjunctiva is not injected. Chemosis present. No hemorrhage.     Pupils: Pupils are equal, round, and reactive to light.      Comments: There is a possible in the left lower lash line near the medial canthus. There is minimal yellow discharge of the left eye. There is crusting and erythema of the conjunctiva.  There is slight chemosis of the left conjunctivae. PERRLA. EOMI.      Cardiovascular:      Rate and Rhythm: Normal rate and regular rhythm.      Pulses: Normal pulses.      Heart sounds: Normal heart sounds. No murmur heard.     No friction rub.   Pulmonary:      Effort: Pulmonary effort is normal. No respiratory distress.      Breath sounds: Normal breath sounds. No stridor. No wheezing or rhonchi.     Skin:     General: Skin is warm and dry.      Capillary Refill: Capillary refill takes less than 2 seconds.     Neurological:      General: No focal deficit present.      Mental Status: She is alert and oriented to person, place, and time. Mental status is at baseline.     Psychiatric:         Mood and Affect: Mood normal.         Behavior: Behavior normal.         Thought Content: Thought content normal.         Judgment: Judgment normal.                          [1]   Current Outpatient Medications:     ofloxacin (OCUFLOX) 0.3 % ophthalmic solution, Administer 1 drop into the left eye 4 (four) times a day for 7 days, Disp: 5 mL, Rfl: 0  [2] No past medical history on  file.  [3] No past surgical history on file.  [4] No family history on file.

## 2025-06-29 NOTE — PROGRESS NOTES
[facility]        NAME: Carla Gomez is a 79 y.o. female  : 1945    MRN: [id number]  DATE: 2025  TIME: 4:01 PM    Assessment and Plan   No primary diagnosis found.  No diagnosis found.    -There is a stye in the left lower lash line near the medial canthus. There is minimal yellow discharge of the left eye. There is slight chemosis of the left conjunctivae.         Patient Instructions       -Follow up with PCP in 3-5 days.  -Proceed to ER if symptoms worsen.    Chief Complaint     Chief Complaint   Patient presents with    Eye Problem     Red irritated left eye lid with ptosis had surgery on eye lid 3 yrs. ago         History of Present Illness       Pt is a 79-year-old female here with complaints of a red and irritated left eyelid since yesterday 25. Pt had surgery on eye lid 3 years ago. Pt did not wake up with it crusted shut but woke up with yellow discharge. No photophobia. No vision changes. No injury to her left eye. No foreign body. No headache. Slight pruritis.     Eye Problem   The left eye is affected. This is a new problem. The current episode started yesterday. There was no injury mechanism. The pain is at a severity of 0/10. The patient is experiencing no pain. Associated symptoms include an eye discharge and itching. Pertinent negatives include no blurred vision, double vision, eye redness, fever, foreign body sensation, nausea, photophobia, recent URI, vomiting or weakness. She has tried nothing for the symptoms.       Review of Systems   Review of Systems   Constitutional:  Negative for activity change, appetite change, chills, diaphoresis, fatigue and fever.   HENT:  Negative for drooling, ear pain, facial swelling, hearing loss, rhinorrhea, sinus pressure, sinus pain, sneezing, sore throat, tinnitus, trouble swallowing and voice change.    Eyes:  Positive for discharge and itching. Negative for blurred vision, double vision, photophobia, pain, redness and  "visual disturbance.   Respiratory:  Negative for cough, choking, chest tightness, shortness of breath, wheezing and stridor.    Cardiovascular:  Negative for chest pain, palpitations and leg swelling.   Gastrointestinal:  Negative for constipation, diarrhea, nausea and vomiting.   Skin:  Negative for rash and wound.   Neurological:  Negative for dizziness, tremors, syncope, facial asymmetry, speech difficulty, weakness, light-headedness, numbness and headaches.   Psychiatric/Behavioral:  The patient is not nervous/anxious.          Current Medications     Current Medications[1]    Current Allergies     Allergies as of 06/29/2025 - Reviewed 06/29/2025   Allergen Reaction Noted    Sulfa antibiotics Hives 03/29/2023            The following portions of the patient's history were reviewed and updated as appropriate: allergies, current medications, past family history, past medical history, past social history, past surgical history and problem list.     Past Medical History[2]    Past Surgical History[3]    Family History[4]      Medications have been verified.        Objective   /68   Pulse 69   Temp (!) 97.4 °F (36.3 °C)   Resp 18   Ht 5' 1\" (1.549 m)   Wt 83.5 kg (184 lb)   SpO2 96%   BMI 34.77 kg/m²        Physical Exam     Physical Exam  Vitals reviewed.   Constitutional:       General: She is not in acute distress.     Appearance: Normal appearance. She is normal weight. She is not ill-appearing, toxic-appearing or diaphoretic.   HENT:      Nose: No congestion or rhinorrhea.     Eyes:      General: Lids are normal. Lids are everted, no foreign bodies appreciated. Vision grossly intact.         Right eye: No foreign body, discharge or hordeolum.         Left eye: Discharge and hordeolum present.No foreign body.      Extraocular Movements: Extraocular movements intact.      Conjunctiva/sclera:      Right eye: Right conjunctiva is not injected. No chemosis or exudate.     Left eye: Left conjunctiva is " not injected. Chemosis present. No hemorrhage.     Pupils: Pupils are equal, round, and reactive to light.      Comments: There is a stye in the left lower lash line near the medial canthus. There is minimal yellow discharge of the left eye. There is slight chemosis of the left conjunctivae.      Cardiovascular:      Rate and Rhythm: Normal rate and regular rhythm.      Pulses: Normal pulses.      Heart sounds: Normal heart sounds. No murmur heard.     No friction rub.   Pulmonary:      Effort: Pulmonary effort is normal. No respiratory distress.      Breath sounds: Normal breath sounds. No stridor. No wheezing or rhonchi.     Skin:     General: Skin is warm and dry.      Capillary Refill: Capillary refill takes less than 2 seconds.     Neurological:      General: No focal deficit present.      Mental Status: She is alert and oriented to person, place, and time. Mental status is at baseline.     Psychiatric:         Mood and Affect: Mood normal.         Behavior: Behavior normal.         Thought Content: Thought content normal.         Judgment: Judgment normal.                          [1] No current outpatient medications on file.  [2] No past medical history on file.  [3] No past surgical history on file.  [4] No family history on file.